# Patient Record
Sex: MALE | NOT HISPANIC OR LATINO | URBAN - METROPOLITAN AREA
[De-identification: names, ages, dates, MRNs, and addresses within clinical notes are randomized per-mention and may not be internally consistent; named-entity substitution may affect disease eponyms.]

---

## 2017-09-27 ENCOUNTER — IMPORTED ENCOUNTER (OUTPATIENT)
Dept: URBAN - METROPOLITAN AREA CLINIC 1 | Facility: CLINIC | Age: 52
End: 2017-09-27

## 2017-09-27 PROBLEM — H52.4: Noted: 2017-09-27

## 2017-09-27 PROCEDURE — S0621 ROUTINE OPHTHALMOLOGICAL EXA: HCPCS

## 2017-09-27 NOTE — PATIENT DISCUSSION
1.  Presbyopia: Rx was given for corrective spectacles if indicated. 2.  HX recurrent iritis3. Return for an appointment in 1 year for 40. with Dr. Ruddy Duron. 4.  Return for an appointment in 3-6 months for 30. with Dr. Ruddy Duron.

## 2019-11-11 ENCOUNTER — IMPORTED ENCOUNTER (OUTPATIENT)
Dept: URBAN - METROPOLITAN AREA CLINIC 1 | Facility: CLINIC | Age: 54
End: 2019-11-11

## 2019-11-11 PROBLEM — H43.812: Noted: 2019-11-11

## 2019-11-11 PROBLEM — H04.123: Noted: 2019-11-11

## 2019-11-11 PROCEDURE — 92015 DETERMINE REFRACTIVE STATE: CPT

## 2019-11-11 PROCEDURE — 92014 COMPRE OPH EXAM EST PT 1/>: CPT

## 2019-11-11 NOTE — PATIENT DISCUSSION
1.  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 RD precautions. 2.  Dry Eyes OU - Use ATs TID OU Routinely. 3.  Pseudophakia OU 4. H/o HLA-B27 Iritis OD- inactive. AC quiet today on no gtts. Cont to observe off gtts. Letter to PCP MRx given Return for an appointment in 1 yr 27 with Dr. Shilo Mauricio.

## 2020-03-19 ENCOUNTER — IMPORTED ENCOUNTER (OUTPATIENT)
Dept: URBAN - METROPOLITAN AREA CLINIC 1 | Facility: CLINIC | Age: 55
End: 2020-03-19

## 2020-03-19 PROBLEM — H10.522: Noted: 2020-03-19

## 2020-03-19 PROBLEM — H01.004: Noted: 2020-03-19

## 2020-03-19 PROBLEM — H01.001: Noted: 2020-03-19

## 2020-03-19 PROBLEM — H02.055: Noted: 2020-03-19

## 2020-03-19 PROCEDURE — 92012 INTRM OPH EXAM EST PATIENT: CPT

## 2020-03-19 PROCEDURE — 67820 REVISE EYELASHES: CPT

## 2020-03-19 NOTE — PATIENT DISCUSSION
1.  Blepharoconjunctivitis OS - Begin Zylet TID OS (sample x2 given use until gone). DC Visine. Recommend ATs TID OU routinely 2. Trichiasis Left Eyelid - An inturned lash of the left eyelid was present. Epilation with a forceps was recommended and performed. Epilation with Forceps OS: Risks benefits and alternatives to epilation of eyelash was discussed with patient. Patient understands and would like to proceed. Consent was signed. Post op instructions were discussed/given to patient and patient was advised to call our office if any problems. 3. Anterior Blepharitis OU - Daily Hot compresses and lid scrubs were recommended. 4. Dry Eyes OU - Use ATs TID OU Routinely. 5.  Pseudophakia OU - (Standard w/ PLRI OU) 6. H/o HLA-B27 Iritis OD- inactive. AC quiet today on no gtts. Cont to observe off gtts. 7.  H/o PVD OSReturn for an appointment for Return as scheduled 27 with Dr. Shilo Mauricio.

## 2020-03-19 NOTE — PATIENT DISCUSSION
Dry Eyes OU - Use ATs TID OU Routinely. 3.  Pseudophakia OU - (Standard w/ PLRI OU) 4. H/o HLA-B27 Iritis OD- inactive. AC quiet today on no gtts. Cont to observe off gtts.  5.  H/o PVD OS

## 2020-03-19 NOTE — PATIENT DISCUSSION
Epilation with Forceps OS: Risks benefits and alternatives to epilation of eyelash was discussed with patient. Patient understands and would like to proceed. Consent was signed. Post op instructions were discussed/given to patient and patient was advised to call our office if any problems.

## 2021-05-07 ENCOUNTER — IMPORTED ENCOUNTER (OUTPATIENT)
Dept: URBAN - METROPOLITAN AREA CLINIC 1 | Facility: CLINIC | Age: 56
End: 2021-05-07

## 2021-05-07 PROBLEM — H35.372: Noted: 2021-05-07

## 2021-05-07 PROBLEM — H26.493: Noted: 2021-05-07

## 2021-05-07 PROBLEM — H01.004: Noted: 2021-05-07

## 2021-05-07 PROBLEM — H01.001: Noted: 2021-05-07

## 2021-05-07 PROCEDURE — 92015 DETERMINE REFRACTIVE STATE: CPT

## 2021-05-07 PROCEDURE — 92014 COMPRE OPH EXAM EST PT 1/>: CPT

## 2021-05-07 NOTE — PATIENT DISCUSSION
1.  PCO OU- Visually Significant *secondary to glare*; schedule YAG Cap. Pros cons risks and benefits. 2.  Epiretinal Membrane OS - Observe for change. 3. Anterior Blepharitis OU - Daily Hot compresses and lid scrubs were recommended. 4. Dry Eyes OU - Use ATs TID OU Routinely. 5.  Pseudophakia OU - (Standard w/ PLRI OU) 6. H/o HLA-B27 Iritis OD- inactive. AC quiet today on no gtts. Cont to observe off gtts. 7.  H/o PVD OSReturn for an appointment for YAG Cap OD then OS with Dr. Viki Rivera.

## 2021-05-14 ENCOUNTER — IMPORTED ENCOUNTER (OUTPATIENT)
Dept: URBAN - METROPOLITAN AREA CLINIC 1 | Facility: CLINIC | Age: 56
End: 2021-05-14

## 2021-05-14 PROBLEM — H26.491: Noted: 2021-05-14

## 2021-05-14 PROCEDURE — 66821 AFTER CATARACT LASER SURGERY: CPT

## 2021-05-28 ENCOUNTER — IMPORTED ENCOUNTER (OUTPATIENT)
Dept: URBAN - METROPOLITAN AREA CLINIC 1 | Facility: CLINIC | Age: 56
End: 2021-05-28

## 2021-05-28 PROBLEM — H26.492: Noted: 2021-05-28

## 2021-05-28 PROCEDURE — 66821 AFTER CATARACT LASER SURGERY: CPT

## 2021-05-28 NOTE — PATIENT DISCUSSION
YAG CAP OS: (Consent signed and scanned into attachments) 1 gtt Prolensa applied. The purpose and nature of the procedure possible alternative methods of treatment the risks involved and the possibility of complications were discussed with patient. The Patient wishes to proceed and the consent was signed. The laser was then performed under topical anesthesia with no complications. Post op instructions were given to patient as well as a follow-up appointment. Patient was advised to call our office if any questions or concerns. Return for an appointment in 1-4 weeks po/yag with Dr. Hemalatha Orozco.

## 2021-06-25 ENCOUNTER — IMPORTED ENCOUNTER (OUTPATIENT)
Dept: URBAN - METROPOLITAN AREA CLINIC 1 | Facility: CLINIC | Age: 56
End: 2021-06-25

## 2021-06-25 PROCEDURE — 99024 POSTOP FOLLOW-UP VISIT: CPT

## 2021-06-25 NOTE — PATIENT DISCUSSION
PO YAG Cap OU -- good result. MRX given. Return for an appointment in April for a 27 with Dr. Renetta Altamirano.

## 2022-04-02 ASSESSMENT — VISUAL ACUITY
OS_CC: 20/20
OS_CC: 20/20
OS_CC: 20/25
OU_SC: J2
OD_CC: 20/25
OD_SC: J2
OD_CC: 20/20
OS_GLARE: 20/50
OS_CC: 20/25
OD_CC: 20/20
OD_CC: 20/20
OD_CC: 20/25
OS_CC: 20/20
OS_SC: J2
OD_GLARE: 20/50

## 2022-04-02 ASSESSMENT — TONOMETRY
OS_IOP_MMHG: 13
OS_IOP_MMHG: 15
OD_IOP_MMHG: 18
OD_IOP_MMHG: 16
OS_IOP_MMHG: 20
OD_IOP_MMHG: 15
OD_IOP_MMHG: 16
OD_IOP_MMHG: 15
OS_IOP_MMHG: 16
OS_IOP_MMHG: 16

## 2023-11-28 ENCOUNTER — COMPREHENSIVE EXAM (OUTPATIENT)
Dept: URBAN - METROPOLITAN AREA CLINIC 1 | Facility: CLINIC | Age: 58
End: 2023-11-28

## 2023-11-28 DIAGNOSIS — H02.88B: ICD-10-CM

## 2023-11-28 DIAGNOSIS — Z96.1: ICD-10-CM

## 2023-11-28 DIAGNOSIS — H02.88A: ICD-10-CM

## 2023-11-28 DIAGNOSIS — H01.02B: ICD-10-CM

## 2023-11-28 DIAGNOSIS — H01.02A: ICD-10-CM

## 2023-11-28 PROCEDURE — 92015 DETERMINE REFRACTIVE STATE: CPT

## 2023-11-28 PROCEDURE — 92014 COMPRE OPH EXAM EST PT 1/>: CPT

## 2023-11-28 ASSESSMENT — VISUAL ACUITY
OD_SC: J5
OU_SC: J3
OU_SC: 20/20
OD_SC: 20/20
OS_SC: 20/20
OS_SC: J4

## 2023-11-28 ASSESSMENT — TONOMETRY
OS_IOP_MMHG: 14
OD_IOP_MMHG: 14

## 2024-09-03 ENCOUNTER — EMERGENCY VISIT (OUTPATIENT)
Dept: URBAN - METROPOLITAN AREA CLINIC 1 | Facility: CLINIC | Age: 59
End: 2024-09-03

## 2024-09-03 DIAGNOSIS — H20.021: ICD-10-CM

## 2024-09-03 PROCEDURE — 92012 INTRM OPH EXAM EST PATIENT: CPT

## 2024-09-26 ENCOUNTER — FOLLOW UP (OUTPATIENT)
Dept: URBAN - METROPOLITAN AREA CLINIC 1 | Facility: CLINIC | Age: 59
End: 2024-09-26

## 2024-09-26 DIAGNOSIS — H20.021: ICD-10-CM

## 2024-09-26 PROCEDURE — 92012 INTRM OPH EXAM EST PATIENT: CPT
